# Patient Record
Sex: FEMALE | Race: WHITE | Employment: OTHER | URBAN - METROPOLITAN AREA
[De-identification: names, ages, dates, MRNs, and addresses within clinical notes are randomized per-mention and may not be internally consistent; named-entity substitution may affect disease eponyms.]

---

## 2019-10-01 ENCOUNTER — OFFICE VISIT (OUTPATIENT)
Dept: ORTHOPEDIC SURGERY | Facility: CLINIC | Age: 84
End: 2019-10-01

## 2019-10-01 VITALS
SYSTOLIC BLOOD PRESSURE: 125 MMHG | OXYGEN SATURATION: 97 % | HEIGHT: 60 IN | HEART RATE: 61 BPM | WEIGHT: 120 LBS | BODY MASS INDEX: 23.56 KG/M2 | DIASTOLIC BLOOD PRESSURE: 47 MMHG | RESPIRATION RATE: 16 BRPM | TEMPERATURE: 98.3 F

## 2019-10-01 DIAGNOSIS — M75.101 ROTATOR CUFF SYNDROME, RIGHT: Primary | ICD-10-CM

## 2019-10-01 RX ORDER — ALPRAZOLAM 0.25 MG/1
TABLET ORAL
COMMUNITY

## 2019-10-01 RX ORDER — TRIAMCINOLONE ACETONIDE 40 MG/ML
40 INJECTION, SUSPENSION INTRA-ARTICULAR; INTRAMUSCULAR ONCE
Qty: 1 VIAL | Refills: 0
Start: 2019-10-01 | End: 2019-10-01

## 2019-10-01 RX ORDER — ALBUTEROL SULFATE 90 UG/1
AEROSOL, METERED RESPIRATORY (INHALATION) AS NEEDED
COMMUNITY

## 2019-10-01 NOTE — PROGRESS NOTES
Verbal order given by Dr. Jayda Murillo to draw up 1cc of Kenalog and 3 cc of Xylocaine  Injection given in RIGHT shoulder

## 2019-10-01 NOTE — PROGRESS NOTES
Patient: Poonam Vasquez                MRN: [id number]       SSN: [id number]  YOB: 1930        AGE: 80 y.o. SEX: female  Body mass index is 23.44 kg/m². PCP: No primary care provider on file. 10/01/19    Chief Complaint: Right shoulder pain    HISTORY OF PRESENT ILLNESS:  Elihu Closs is a very pleasant, 80year-old female who comes in the office today with right shoulder pain. She has had right shoulder pain off and on for some time, but it got worse recently when her  who has some dementia fell directly into her arm. Since that time, she has been noticing difficulty with lifting and doing activities at home, as well as with overhead activities. She has pain at night. She has not had any specific physical therapy or injections for her shoulder. She has had x-rays. She denies any specific numbness or tingling. Past Medical History:   Diagnosis Date    Arthritis     bursitis l shoulder    Diverticular disease     Gall stones     GERD (gastroesophageal reflux disease)     Heart murmur     Hemorrhoids     Hypertension     Osteopenia     Osteoporosis     Other ill-defined conditions(799.89)     mur mur       History reviewed. No pertinent family history. Current Outpatient Medications   Medication Sig Dispense Refill    albuterol (PROVENTIL HFA, VENTOLIN HFA, PROAIR HFA) 90 mcg/actuation inhaler Take  by inhalation as needed for Wheezing.  ALPRAZolam (XANAX) 0.25 mg tablet Take  by mouth.  triamcinolone acetonide (KENALOG-40) 40 mg/mL injection 1 mL by Intra artICUlar route once for 1 dose. 1 Vial 0    metoprolol (LOPRESSOR) 25 mg tablet Take 25 mg by mouth two (2) times a day.  losartan (COZAAR) 100 mg tablet Take 50 mg by mouth daily.  simvastatin (ZOCOR) 20 mg tablet Take 20 mg by mouth nightly.  Cholecalciferol, Vitamin D3, (VITAMIN D3) 1,000 unit cap Take 1,000 mg by mouth.         aspirin 81 mg tablet Take 325 mg by mouth daily.  multivitamin (ONE A DAY) tablet Take 1 Tab by mouth daily.  omeprazole (PRILOSEC) 20 mg capsule Take 20 mg by mouth daily.  raloxifene (EVISTA) 60 mg tablet Take 60 mg by mouth daily.  amLODIPine (NORVASC) 5 mg tablet Take 5 mg by mouth daily.            Allergies   Allergen Reactions    Amoxicillin Hives    Feldene [Piroxicam] Other (comments)     Excess urination    Fosamax [Alendronate] Nausea and Vomiting    Naproxen Other (comments)     dizzy    Vicodin [Hydrocodone-Acetaminophen] Hives    Sulfa (Sulfonamide Antibiotics) Unknown (comments)    Tiazac [Diltiazem Hcl] Unknown (comments)       Past Surgical History:   Procedure Laterality Date    ABDOMEN SURGERY PROC UNLISTED      gall bladder    HX GI      colonoscopy    HX GYN      btl, D & C    HX HEENT      T & A       Social History     Socioeconomic History    Marital status:      Spouse name: Not on file    Number of children: Not on file    Years of education: Not on file    Highest education level: Not on file   Occupational History    Not on file   Social Needs    Financial resource strain: Not on file    Food insecurity:     Worry: Not on file     Inability: Not on file    Transportation needs:     Medical: Not on file     Non-medical: Not on file   Tobacco Use    Smoking status: Former Smoker    Smokeless tobacco: Never Used   Substance and Sexual Activity    Alcohol use: No    Drug use: No    Sexual activity: Not on file   Lifestyle    Physical activity:     Days per week: Not on file     Minutes per session: Not on file    Stress: Not on file   Relationships    Social connections:     Talks on phone: Not on file     Gets together: Not on file     Attends Shinto service: Not on file     Active member of club or organization: Not on file     Attends meetings of clubs or organizations: Not on file     Relationship status: Not on file    Intimate partner violence:     Fear of current or ex partner: Not on file     Emotionally abused: Not on file     Physically abused: Not on file     Forced sexual activity: Not on file   Other Topics Concern    Not on file   Social History Narrative    Not on file       REVIEW OF SYSTEMS:      CON: negative for recent weight loss/gain, fever, or chills  EYE: negative for double or blurry vision  ENT: negative for hoarseness  RS:   negative for cough, URI, SOB  CV:  negative for chest pain, palpitations  GI:    negative for blood in stool, nausea/vomiting  :  negative for blood in urine  MS: As per HPI  Other systems reviewed and noted below. PHYSICAL EXAMINATION:  Visit Vitals  /47   Pulse 61   Temp 98.3 °F (36.8 °C) (Oral)   Resp 16   Ht 5' (1.524 m)   Wt 120 lb (54.4 kg)   SpO2 97%   BMI 23.44 kg/m²     Body mass index is 23.44 kg/m². GENERAL: Alert and oriented x3, in no acute distress, well-developed, well-nourished. HEENT: Normocephalic, atraumatic. RESP: Non labored breathing with equal chest rise on inspiration. CV: Well perfused extremities. No cyanosis or clubbing noted. ABDOMEN: Soft, non-tender, non-distended. PHYSICAL EXAM:  Physical exam of the right shoulder with full range of motion. Pain and weakness with supraspinatus and infraspinatus testing. She does have some mild pain, but no weakness with belly press testing. She is nontender over the [facility] joint. No pain with impingement. She is neurovascularly intact distally. IMAGING:  X-rays of the right shoulder were taken in the office today. These show some sclerosis of the greater tuberosity, but no significant bony abnormalities otherwise. ASSESSMENT AND PLAN:   Homero Fink is an 80year-old female with likely a right rotator cuff tear. We had a lengthy discussion today regarding treatment options. I would hold off on ordering an MRI. I would like for her to try some home exercises along with a cortisone shot in the office today to see how she responds.   I will see her back as needed.         [facility] - [facility]  OFFICE PROCEDURE PROGRESS NOTE        Chart reviewed for the following:   Florian Essex, MD, have reviewed the History, Physical and updated the Allergic reactions for [address] performed immediately prior to start of procedure:   Florian Essex, MD, have performed the following reviews on Sepideh Mallard prior to the start of the procedure:            * Patient was identified by name and date of birth   * Agreement on procedure being performed was verified  * Risks and Benefits explained to the patient  * Procedure site verified and marked as necessary  * Patient was positioned for comfort  * Consent was signed and verified    Time: 1400      Date of procedure: 10/1/2019    Procedure performed by:  Julia Brush MD    Provider assisted by: Skyla Vera LPN    Patient assisted by: self    How tolerated by patient: tolerated the procedure well with no complications    Post Procedural Pain Scale: 0 - No Hurt    Comments: none                  Electronically signed by: Julia Brush MD

## 2019-12-24 ENCOUNTER — OFFICE VISIT (OUTPATIENT)
Dept: ORTHOPEDIC SURGERY | Facility: CLINIC | Age: 84
End: 2019-12-24

## 2019-12-24 VITALS
SYSTOLIC BLOOD PRESSURE: 157 MMHG | DIASTOLIC BLOOD PRESSURE: 60 MMHG | TEMPERATURE: 97.2 F | OXYGEN SATURATION: 92 % | HEIGHT: 59 IN | HEART RATE: 65 BPM | WEIGHT: 117 LBS | RESPIRATION RATE: 16 BRPM | BODY MASS INDEX: 23.59 KG/M2

## 2019-12-24 DIAGNOSIS — M75.101 ROTATOR CUFF SYNDROME, RIGHT: Primary | ICD-10-CM

## 2019-12-24 DIAGNOSIS — M19.011 ARTHRITIS OF RIGHT ACROMIOCLAVICULAR JOINT: ICD-10-CM

## 2019-12-24 RX ORDER — TRIAMCINOLONE ACETONIDE 40 MG/ML
40 INJECTION, SUSPENSION INTRA-ARTICULAR; INTRAMUSCULAR ONCE
Qty: 1 ML | Refills: 0
Start: 2019-12-24 | End: 2019-12-24

## 2019-12-24 RX ORDER — SERTRALINE HYDROCHLORIDE 100 MG/1
TABLET, FILM COATED ORAL DAILY
COMMUNITY

## 2019-12-24 NOTE — PROGRESS NOTES
Patient: Autumn Torres                MRN: [id number]       SSN: [id number]  YOB: 1930        AGE: 80 y.o. SEX: female  Body mass index is 23.46 kg/m². PCP: No primary care provider on file. 12/24/19    Chief Complaint: Right shoulder follow up    HISTORY OF PRESENT ILLNESS:  Zach Clemente returns to the office today for her right shoulder. She has continued to have some right shoulder pain, especially when reaching up and reaching across her body. She takes care of her  at home, which is also putting a lot of stress on her shoulder. She has had injections in the past, which she said helped for about a month and a half. Past Medical History:   Diagnosis Date    Arthritis     bursitis l shoulder    Diverticular disease     Gall stones     GERD (gastroesophageal reflux disease)     Heart murmur     Hemorrhoids     Hypertension     Osteopenia     Osteoporosis     Other ill-defined conditions(799.89)     mur mur       History reviewed. No pertinent family history. Current Outpatient Medications   Medication Sig Dispense Refill    sertraline (ZOLOFT) 100 mg tablet Take  by mouth daily.  triamcinolone acetonide (KENALOG) 40 mg/mL injection 1 mL by Intra artICUlar route once for 1 dose. 1 mL 0    triamcinolone acetonide (KENALOG) 40 mg/mL injection 1 mL by Intra artICUlar route once for 1 dose. 1 mL 0    albuterol (PROVENTIL HFA, VENTOLIN HFA, PROAIR HFA) 90 mcg/actuation inhaler Take  by inhalation as needed for Wheezing.  ALPRAZolam (XANAX) 0.25 mg tablet Take  by mouth.  omeprazole (PRILOSEC) 20 mg capsule Take 20 mg by mouth daily.  metoprolol (LOPRESSOR) 25 mg tablet Take 25 mg by mouth two (2) times a day.  raloxifene (EVISTA) 60 mg tablet Take 60 mg by mouth daily.  amLODIPine (NORVASC) 5 mg tablet Take 5 mg by mouth daily.  losartan (COZAAR) 100 mg tablet Take 50 mg by mouth daily.       simvastatin (ZOCOR) 20 mg tablet Take 20 mg by mouth nightly.  Cholecalciferol, Vitamin D3, (VITAMIN D3) 1,000 unit cap Take 1,000 mg by mouth.  aspirin 81 mg tablet Take 325 mg by mouth daily.  multivitamin (ONE A DAY) tablet Take 1 Tab by mouth daily.            Allergies   Allergen Reactions    Amoxicillin Hives    Feldene [Piroxicam] Other (comments)     Excess urination    Fosamax [Alendronate] Nausea and Vomiting    Naproxen Other (comments)     dizzy    Vicodin [Hydrocodone-Acetaminophen] Hives    Sulfa (Sulfonamide Antibiotics) Unknown (comments)    Tiazac [Diltiazem Hcl] Unknown (comments)       Past Surgical History:   Procedure Laterality Date    ABDOMEN SURGERY PROC UNLISTED      gall bladder    HX GI      colonoscopy    HX GYN      btl, D & C    HX HEENT      T & A       Social History     Socioeconomic History    Marital status:      Spouse name: Not on file    Number of children: Not on file    Years of education: Not on file    Highest education level: Not on file   Occupational History    Not on file   Social Needs    Financial resource strain: Not on file    Food insecurity:     Worry: Not on file     Inability: Not on file    Transportation needs:     Medical: Not on file     Non-medical: Not on file   Tobacco Use    Smoking status: Former Smoker    Smokeless tobacco: Never Used   Substance and Sexual Activity    Alcohol use: No    Drug use: No    Sexual activity: Not on file   Lifestyle    Physical activity:     Days per week: Not on file     Minutes per session: Not on file    Stress: Not on file   Relationships    Social connections:     Talks on phone: Not on file     Gets together: Not on file     Attends [employer] service: Not on file     Active member of club or organization: Not on file     Attends meetings of clubs or organizations: Not on file     Relationship status: Not on file    Intimate partner violence:     Fear of current or ex partner: Not on file Emotionally abused: Not on file     Physically abused: Not on file     Forced sexual activity: Not on file   Other Topics Concern    Not on file   Social History Narrative    Not on file       REVIEW OF SYSTEMS:      No changes from previous review of systems unless noted. PHYSICAL EXAMINATION:  Visit Vitals  /60 (BP 1 Location: Left arm, BP Patient Position: Sitting)   Pulse 65   Temp 97.2 °F (36.2 °C) (Oral)   Resp 16   Ht 4' 11.21\" (1.504 m)   Wt 117 lb (53.1 kg)   SpO2 92%   BMI 23.46 kg/m²     Body mass index is 23.46 kg/m². GENERAL: Alert and oriented x3, in no acute distress. HEENT: Normocephalic, atraumatic. RESP: Non labored breathing. SKIN: No rashes or lesions noted. PHYSICAL EXAM:  Physical exam of the right shoulder with full range of motion, but pain with cross body adduction, as well as forward flexion. She is tender to palpation over the [facility] joint. She also has some mild tenderness to palpation over the bicipital groove. She has pain with resisted supraspinatus and infraspinatus rotator cuff testing. She is neurovascularly intact distally. ASSESSMENT AND PLAN:   Lc Maguire continues to have right shoulder pain, likely related to some mild AC arthritis, as well as rotator cuff pathology. At this point, she is not a good candidate for surgery and I would not recommend that. I did talk with her about physical therapy, but with her 's situation, she really does not have time for that. She would like to try another cortisone shot, which I gave her in the [facility] joint, as well as the subacromial space. We will see how she responds. Follow up as needed.        [facility] - [facility]  OFFICE PROCEDURE PROGRESS NOTE        Chart reviewed for the following:   Rolan Mao MD, have reviewed the History, Physical and updated the Allergic reactions for [employer] performed immediately prior to start of procedure:   Barbie Lehman Rosa Elena Winter MD, have performed the following reviews on Gail Sexton prior to the start of the procedure:            * Patient was identified by name and date of birth   * Agreement on procedure being performed was verified  * Risks and Benefits explained to the patient  * Procedure site verified and marked as necessary  * Patient was positioned for comfort  * Consent was signed and verified    Time: 1130      Date of procedure: 12/24/2019    Procedure performed by:  Angie Montana MD    Provider assisted by: Argenis Davis LPN    Patient assisted by: self    How tolerated by patient: tolerated the procedure well with no complications    Post Procedural Pain Scale: 0 - No Hurt    Comments: none                  Electronically signed by: Angie Montana MD

## 2019-12-24 NOTE — PROGRESS NOTES
1. Have you been to the ER, urgent care clinic since your last visit? Hospitalized since your last visit? NO    2. Have you seen or consulted any other health care providers outside of the [address] [place] [address] since your last visit? Include any pap smears or colon screening.    NO

## 2020-02-11 ENCOUNTER — OFFICE VISIT (OUTPATIENT)
Dept: ORTHOPEDIC SURGERY | Facility: CLINIC | Age: 85
End: 2020-02-11

## 2020-02-11 VITALS
DIASTOLIC BLOOD PRESSURE: 55 MMHG | WEIGHT: 115 LBS | OXYGEN SATURATION: 95 % | TEMPERATURE: 97.6 F | HEART RATE: 60 BPM | HEIGHT: 59 IN | BODY MASS INDEX: 23.18 KG/M2 | SYSTOLIC BLOOD PRESSURE: 140 MMHG | RESPIRATION RATE: 18 BRPM

## 2020-02-11 DIAGNOSIS — M75.101 ROTATOR CUFF SYNDROME, RIGHT: Primary | ICD-10-CM

## 2020-02-11 DIAGNOSIS — M19.011 ARTHRITIS OF RIGHT ACROMIOCLAVICULAR JOINT: ICD-10-CM

## 2020-02-11 RX ORDER — TRIAMCINOLONE ACETONIDE 40 MG/ML
40 INJECTION, SUSPENSION INTRA-ARTICULAR; INTRAMUSCULAR ONCE
Qty: 1 VIAL | Refills: 0
Start: 2020-02-11 | End: 2020-02-11

## 2020-02-11 NOTE — PROGRESS NOTES
Patient: Helga Christian                MRN: [id number]       SSN: [id number]  YOB: 1930        AGE: 80 y.o. SEX: female  Body mass index is 23.23 kg/m². PCP: No primary care provider on file. 02/11/20    Chief Complaint: Right shoulder pain    HISTORY OF PRESENT ILLNESS:  Umesh Hanson returns today for her right shoulder. She reports pain with movement. At rest, she has very little pain. It is mostly over the [facility] joint and deep in the shoulder. She has been dealing with it recently. Unfortunately, her  recently passed away. She has been dealing with that since I last saw her. Past Medical History:   Diagnosis Date    Arthritis     bursitis l shoulder    Diverticular disease     Gall stones     GERD (gastroesophageal reflux disease)     Heart murmur     Hemorrhoids     Hypertension     Osteopenia     Osteoporosis     Other ill-defined conditions(599.13)     mur mur       History reviewed. No pertinent family history. Current Outpatient Medications   Medication Sig Dispense Refill    triamcinolone acetonide (KENALOG-40) 40 mg/mL injection 1 mL by Intra artICUlar route once for 1 dose. 1 Vial 0    sertraline (ZOLOFT) 100 mg tablet Take  by mouth daily.  albuterol (PROVENTIL HFA, VENTOLIN HFA, PROAIR HFA) 90 mcg/actuation inhaler Take  by inhalation as needed for Wheezing.  ALPRAZolam (XANAX) 0.25 mg tablet Take  by mouth.  metoprolol (LOPRESSOR) 25 mg tablet Take 25 mg by mouth two (2) times a day.  losartan (COZAAR) 100 mg tablet Take 50 mg by mouth daily.  simvastatin (ZOCOR) 20 mg tablet Take 20 mg by mouth nightly.  Cholecalciferol, Vitamin D3, (VITAMIN D3) 1,000 unit cap Take 1,000 mg by mouth.  aspirin 81 mg tablet Take 325 mg by mouth daily.  multivitamin (ONE A DAY) tablet Take 1 Tab by mouth daily.  omeprazole (PRILOSEC) 20 mg capsule Take 20 mg by mouth daily.         raloxifene (EVISTA) 60 mg tablet Take 60 mg by mouth daily.  amLODIPine (NORVASC) 5 mg tablet Take 5 mg by mouth daily.            Allergies   Allergen Reactions    Amoxicillin Hives    Feldene [Piroxicam] Other (comments)     Excess urination    Fosamax [Alendronate] Nausea and Vomiting    Naproxen Other (comments)     dizzy    Vicodin [Hydrocodone-Acetaminophen] Hives    Sulfa (Sulfonamide Antibiotics) Unknown (comments)    Tiazac [Diltiazem Hcl] Unknown (comments)       Past Surgical History:   Procedure Laterality Date    ABDOMEN SURGERY PROC UNLISTED      gall bladder    HX GI      colonoscopy    HX GYN      btl, D & C    HX HEENT      T & A       Social History     Socioeconomic History    Marital status:      Spouse name: Not on file    Number of children: Not on file    Years of education: Not on file    Highest education level: Not on file   Occupational History    Not on file   Social Needs    Financial resource strain: Not on file    Food insecurity:     Worry: Not on file     Inability: Not on file    Transportation needs:     Medical: Not on file     Non-medical: Not on file   Tobacco Use    Smoking status: Former Smoker    Smokeless tobacco: Never Used   Substance and Sexual Activity    Alcohol use: No    Drug use: No    Sexual activity: Not on file   Lifestyle    Physical activity:     Days per week: Not on file     Minutes per session: Not on file    Stress: Not on file   Relationships    Social connections:     Talks on phone: Not on file     Gets together: Not on file     Attends Sabianist service: Not on file     Active member of club or organization: Not on file     Attends meetings of clubs or organizations: Not on file     Relationship status: Not on file    Intimate partner violence:     Fear of current or ex partner: Not on file     Emotionally abused: Not on file     Physically abused: Not on file     Forced sexual activity: Not on file   Other Topics Concern    Not on file   Social History Narrative    Not on file       REVIEW OF SYSTEMS:      No changes from previous review of systems unless noted. PHYSICAL EXAMINATION:  Visit Vitals  /55   Pulse 60   Temp 97.6 °F (36.4 °C) (Oral)   Resp 18   Ht 4' 11\" (1.499 m)   Wt 115 lb (52.2 kg)   SpO2 95%   BMI 23.23 kg/m²     Body mass index is 23.23 kg/m². GENERAL: Alert and oriented x3, in no acute distress. HEENT: Normocephalic, atraumatic. RESP: Non labored breathing. SKIN: No rashes or lesions noted. PHYSICAL EXAM:  Physical exam of the right shoulder with full active and passive range of motion. She has pain over the [facility] joint and pain with cross body adduction. She is tender over the [facility] joint. She also has some mild tenderness over the bicipital groove and rotator cuff. She has pain with rotator cuff strength testing. ASSESSMENT AND PLAN:   Jose Alberto Vanegas continues to have right shoulder pain. We discussed several treatment options today. [facility] injection and subacromial injection. She tolerated this well. I will see her back as needed.         [facility] - [facility]  OFFICE PROCEDURE PROGRESS NOTE        Chart reviewed for the following:   Leia Carpenter MD, have reviewed the History, Physical and updated the Allergic reactions for [address] performed immediately prior to start of procedure:   Leia Carpenter MD, have performed the following reviews on [facility] prior to the start of the procedure:            * Patient was identified by name and date of birth   * Agreement on procedure being performed was verified  * Risks and Benefits explained to the patient  * Procedure site verified and marked as necessary  * Patient was positioned for comfort  * Consent was signed and verified    Time: 1620      Date of procedure: 2/11/2020    Procedure performed by:  Shannen Cook MD    Provider assisted by: Susan Orta LPN    Patient assisted by: self    How tolerated by patient: tolerated the procedure well with no complications    Post Procedural Pain Scale: 0 - No Hurt    Comments: none                  Electronically signed by: Beryle Primmer, MD

## 2020-07-28 ENCOUNTER — OFFICE VISIT (OUTPATIENT)
Dept: ORTHOPEDIC SURGERY | Facility: CLINIC | Age: 85
End: 2020-07-28

## 2020-07-28 VITALS
TEMPERATURE: 98.3 F | WEIGHT: 115 LBS | BODY MASS INDEX: 23.18 KG/M2 | RESPIRATION RATE: 16 BRPM | DIASTOLIC BLOOD PRESSURE: 59 MMHG | SYSTOLIC BLOOD PRESSURE: 146 MMHG | OXYGEN SATURATION: 94 % | HEIGHT: 59 IN | HEART RATE: 64 BPM

## 2020-07-28 DIAGNOSIS — M54.32 SCIATICA, LEFT SIDE: Primary | ICD-10-CM

## 2020-07-28 RX ORDER — METHYLPREDNISOLONE 4 MG/1
TABLET ORAL
Qty: 1 DOSE PACK | Refills: 0 | Status: SHIPPED | OUTPATIENT
Start: 2020-07-28

## 2020-07-28 NOTE — PROGRESS NOTES
Patient: Sepideh Contreras                MRN: [id number]       SSN: [id number]  YOB: 1930        AGE: 80 y.o. SEX: female  Body mass index is 23.23 kg/m². PCP: No primary care provider on file.  07/28/20    Chief Complaint: Left leg pain    HPI: Sepideh Contreras is a 80 y.o. female patient who returns to the office with a new complaint. She has pain radiating from her buttock down the posterior aspect of her left leg. She also notices numbness and tingling. She says is worse in the morning when she takes a few steps. She is not taking any medication for this. She has not been seen by spine. Past Medical History:   Diagnosis Date    Arthritis     bursitis l shoulder    Diverticular disease     Gall stones     GERD (gastroesophageal reflux disease)     Heart murmur     Hemorrhoids     Hypertension     Osteopenia     Osteoporosis     Other ill-defined conditions(379.33)     mur mur       History reviewed. No pertinent family history. Current Outpatient Medications   Medication Sig Dispense Refill    methylPREDNISolone (MEDROL DOSEPACK) 4 mg tablet Per dose pack instructions 1 Dose Pack 0    sertraline (ZOLOFT) 100 mg tablet Take  by mouth daily.  albuterol (PROVENTIL HFA, VENTOLIN HFA, PROAIR HFA) 90 mcg/actuation inhaler Take  by inhalation as needed for Wheezing.  ALPRAZolam (XANAX) 0.25 mg tablet Take  by mouth.  metoprolol (LOPRESSOR) 25 mg tablet Take 25 mg by mouth two (2) times a day.  raloxifene (EVISTA) 60 mg tablet Take 60 mg by mouth daily.  amLODIPine (NORVASC) 5 mg tablet Take 5 mg by mouth daily.  losartan (COZAAR) 100 mg tablet Take 50 mg by mouth daily.  Cholecalciferol, Vitamin D3, (VITAMIN D3) 1,000 unit cap Take 1,000 mg by mouth.  aspirin 81 mg tablet Take 325 mg by mouth daily.  multivitamin (ONE A DAY) tablet Take 1 Tab by mouth daily.         omeprazole (PRILOSEC) 20 mg capsule Take 20 mg by mouth daily.  simvastatin (ZOCOR) 20 mg tablet Take 20 mg by mouth nightly.            Allergies   Allergen Reactions    Amoxicillin Hives    Feldene [Piroxicam] Other (comments)     Excess urination    Fosamax [Alendronate] Nausea and Vomiting    Naproxen Other (comments)     dizzy    Vicodin [Hydrocodone-Acetaminophen] Hives    Sulfa (Sulfonamide Antibiotics) Unknown (comments)    Tiazac [Diltiazem Hcl] Unknown (comments)       Past Surgical History:   Procedure Laterality Date    ABDOMEN SURGERY PROC UNLISTED      gall bladder    HX GI      colonoscopy    HX GYN      btl, D & C    HX HEENT      T & A       Social History     Socioeconomic History    Marital status:      Spouse name: Not on file    Number of children: Not on file    Years of education: Not on file    Highest education level: Not on file   Occupational History    Not on file   Social Needs    Financial resource strain: Not on file    Food insecurity     Worry: Not on file     Inability: Not on file    Transportation needs     Medical: Not on file     Non-medical: Not on file   Tobacco Use    Smoking status: Former Smoker    Smokeless tobacco: Never Used   Substance and Sexual Activity    Alcohol use: No    Drug use: No    Sexual activity: Not on file   Lifestyle    Physical activity     Days per week: Not on file     Minutes per session: Not on file    Stress: Not on file   Relationships    Social connections     Talks on phone: Not on file     Gets together: Not on file     Attends [employer] service: Not on file     Active member of club or organization: Not on file     Attends meetings of clubs or organizations: Not on file     Relationship status: Not on file    Intimate partner violence     Fear of current or ex partner: Not on file     Emotionally abused: Not on file     Physically abused: Not on file     Forced sexual activity: Not on file   Other Topics Concern    Not on file   Social History Narrative    Not on file       REVIEW OF SYSTEMS:      No changes from previous review of systems unless noted. PHYSICAL EXAMINATION:  Visit Vitals  /59 (BP 1 Location: Left arm, BP Patient Position: Sitting)   Pulse 64   Temp 98.3 °F (36.8 °C)   Resp 16   Ht 4' 11\" (1.499 m)   Wt 115 lb (52.2 kg)   SpO2 94%   BMI 23.23 kg/m²     Body mass index is 23.23 kg/m². GENERAL: Alert and oriented x3, in no acute distress. HEENT: Normocephalic, atraumatic. RESP: Non labored breathing. SKIN: No rashes or lesions noted. Lumbar Spine Examination  ROM   Flexion    Full   Extension   Full   Lateral Rotation  Full  Tenderness L Spine   None  Stepoff L Spine   None  Sensation L2-S1   Intact  Motor L2-S1    5/5  Focal Neuro Deficits   None  Straight Leg Raise Test   Right Negative  Left Positive  Other Findings:   None      IMAGING:  None    ASSESSMENT & PLAN  Diagnosis: Left sciatic pain    I discussed several treatment options with her and recommended a medrol dose pack and a referral to spine. She will try the dose pack and if her symptoms persist she will make an appointment at the spine center.       Electronically signed by: Pita Newberry MD

## 2021-01-15 ENCOUNTER — IMPORTED ENCOUNTER (OUTPATIENT)
Dept: URBAN - METROPOLITAN AREA CLINIC 1 | Facility: CLINIC | Age: 86
End: 2021-01-15

## 2021-01-15 PROBLEM — Z96.1: Noted: 2021-01-15

## 2021-01-15 PROBLEM — H26.492: Noted: 2021-01-15

## 2021-01-15 PROBLEM — H04.123: Noted: 2021-01-15

## 2021-01-15 PROBLEM — H35.3131: Noted: 2021-01-15

## 2021-01-15 PROCEDURE — 92015 DETERMINE REFRACTIVE STATE: CPT

## 2021-01-15 PROCEDURE — 92004 COMPRE OPH EXAM NEW PT 1/>: CPT

## 2021-01-15 NOTE — PATIENT DISCUSSION
1.  Dry Eyes OU - Recommend ATs BID-TID OU routinely 2. ARMD OU Early/dry/stable. Importance of daily AREDS II study multivitamin and Amsler Grid checks discussed with patient. Patient to follow-up immediately with any new onset of decreased vision and/or metamorphopsia. 3. PCO  OS: (Posterior Capsule Opacification)   Observe and consider yag cap when pt feels pco visually significant and visual acuity decreases to appropriate level. 4. Pseudophakia OU - (Mitrev) 5. 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 Return for an appointment in 1 year 27 with Dr. Marie Shirley.

## 2021-07-13 ENCOUNTER — IMPORTED ENCOUNTER (OUTPATIENT)
Dept: URBAN - METROPOLITAN AREA CLINIC 1 | Facility: CLINIC | Age: 86
End: 2021-07-13

## 2021-07-13 PROBLEM — H04.123: Noted: 2021-07-13

## 2021-07-13 PROBLEM — H18.423: Noted: 2021-07-13

## 2021-07-13 PROBLEM — H01.001: Noted: 2021-07-13

## 2021-07-13 PROBLEM — Z96.1: Noted: 2021-07-13

## 2021-07-13 PROBLEM — H26.492: Noted: 2021-07-13

## 2021-07-13 PROBLEM — H01.004: Noted: 2021-07-13

## 2021-07-13 PROCEDURE — 99213 OFFICE O/P EST LOW 20 MIN: CPT

## 2021-07-13 NOTE — PATIENT DISCUSSION
1.  Anterior Blepharitis OU - Daily Hot compresses and lid scrubs were recommended. 2. Dry Eyes OU - Recommend ATs TID-QID OU routinely  3. Band Keratopathy OU - Observe 4. OHTN OD - IOP elevated today observe off gtts and will recheck at next visit. Consider aqueous suppressants if IOP remains elevated. 5.  PCO  OS: (Posterior Capsule Opacification)   Observe and consider yag cap when pt feels pco visually significant and visual acuity decreases to appropriate level. 5. Pseudophakia OU - (Mitrev) 6. H/o ARMD OU 7. H/o NAION OS - h/o horizontal scotoma VF defectReturn for an appointment in 1-2 months 10/OCT with Dr. Angelita Conner.

## 2021-09-22 ENCOUNTER — IMPORTED ENCOUNTER (OUTPATIENT)
Dept: URBAN - METROPOLITAN AREA CLINIC 1 | Facility: CLINIC | Age: 86
End: 2021-09-22

## 2021-09-22 ENCOUNTER — PREPPED CHART (OUTPATIENT)
Dept: URBAN - METROPOLITAN AREA CLINIC 2 | Facility: CLINIC | Age: 86
End: 2021-09-22

## 2021-09-22 PROBLEM — H40.051: Noted: 2021-09-22

## 2021-09-22 PROCEDURE — 92133 CPTRZD OPH DX IMG PST SGM ON: CPT

## 2021-09-22 PROCEDURE — 99213 OFFICE O/P EST LOW 20 MIN: CPT

## 2021-09-22 NOTE — PATIENT DISCUSSION
(CD:0.10) - OCT WNL. IOP stable. Consider aqueous suppressants with future progression/elevated IOP. Observe for changes/progression.

## 2021-09-22 NOTE — PATIENT DISCUSSION
1.  OHTN OD (CD 0.10) - OCT WNL. IOP stable. Consider aqueous suppressants with future progression/elevated IOP. Observe for changes/progression. 2.  Anterior Blepharitis OU - Daily Hot compresses and lid scrubs were recommended. 3. Dry Eyes OU - Recommend ATs TID-QID OU routinely  4. Band Keratopathy OU - Observe  5. PCO  OS: (Posterior Capsule Opacification)   Observe and consider yag cap when pt feels pco visually significant and visual acuity decreases to appropriate level. 5. Pseudophakia OU - (Mitrev) 6. H/o ARMD OU 7. H/o NAION OS - h/o horizontal scotoma VF defectReturn for an appointment for Return as scheduled with Dr. Carmen Alvarez.

## 2021-09-22 NOTE — PATIENT DISCUSSION
Observe and consider YAG cap when patient feels PCO visually significant and acuity decreases to appropriate level.

## 2021-09-22 NOTE — PATIENT DISCUSSION
Anterior Blepharitis OU - Daily Hot compresses and lid scrubs were recommended. 3. Dry Eyes OU - Recommend ATs TID-QID OU routinely  4. Band Keratopathy OU - Observe  5. PCO  OS: (Posterior Capsule Opacification)   Observe and consider yag cap when pt feels pco visually significant and visual acuity decreases to appropriate level. 5. Pseudophakia OU - (Mitrev) 6. H/o ARMD OU 7.   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

## 2022-02-17 ASSESSMENT — TONOMETRY
OS_IOP_MMHG: 15
OD_IOP_MMHG: 15

## 2022-02-17 ASSESSMENT — VISUAL ACUITY
OS_CC: 20/25
OD_CC: 20/30

## 2022-02-25 ENCOUNTER — COMPREHENSIVE EXAM (OUTPATIENT)
Dept: URBAN - METROPOLITAN AREA CLINIC 2 | Facility: CLINIC | Age: 87
End: 2022-02-25

## 2022-02-25 DIAGNOSIS — H40.051: ICD-10-CM

## 2022-02-25 DIAGNOSIS — Z96.1: ICD-10-CM

## 2022-02-25 DIAGNOSIS — H35.3131: ICD-10-CM

## 2022-02-25 DIAGNOSIS — H26.492: ICD-10-CM

## 2022-02-25 PROCEDURE — 99214 OFFICE O/P EST MOD 30 MIN: CPT

## 2022-02-25 ASSESSMENT — TONOMETRY
OD_IOP_MMHG: 19
OS_IOP_MMHG: 15

## 2022-02-25 ASSESSMENT — VISUAL ACUITY
OS_CC: 20/25
OD_CC: 20/25
OD_SC: 20/25

## 2022-02-25 NOTE — PATIENT DISCUSSION
(CD:0.10) - IOP stable. Consider aqueous suppressants with future progression/elevated IOP. Observe for changes/progression.

## 2022-04-03 ASSESSMENT — VISUAL ACUITY
OD_SC: 20/30
OS_SC: 20/30
OD_SC: 20/25
OD_SC: 20/20-2
OS_SC: 20/25+1
OS_SC: 20/25

## 2022-04-03 ASSESSMENT — TONOMETRY
OD_IOP_MMHG: 22
OS_IOP_MMHG: 11
OD_IOP_MMHG: 15
OD_IOP_MMHG: 11
OS_IOP_MMHG: 15
OS_IOP_MMHG: 14

## 2023-04-14 ENCOUNTER — POST-OP (OUTPATIENT)
Dept: URBAN - METROPOLITAN AREA CLINIC 2 | Facility: CLINIC | Age: 88
End: 2023-04-14

## 2023-04-14 DIAGNOSIS — Z96.1: ICD-10-CM

## 2023-04-14 PROCEDURE — 99024 POSTOP FOLLOW-UP VISIT: CPT

## 2023-04-14 ASSESSMENT — VISUAL ACUITY
OD_SC: 20/30-1
OS_CC: 20/20-1
OD_CC: 20/20+3
OS_SC: 20/60

## 2023-04-14 ASSESSMENT — TONOMETRY
OD_IOP_MMHG: 22
OS_IOP_MMHG: 18

## 2023-06-28 ENCOUNTER — EMERGENCY VISIT (OUTPATIENT)
Dept: URBAN - METROPOLITAN AREA CLINIC 2 | Facility: CLINIC | Age: 88
End: 2023-06-28

## 2023-06-28 DIAGNOSIS — R51.9: ICD-10-CM

## 2023-06-28 PROCEDURE — 92012 INTRM OPH EXAM EST PATIENT: CPT

## 2023-06-28 ASSESSMENT — VISUAL ACUITY
OS_CC: 20/20
OD_CC: 20/25

## 2023-06-28 ASSESSMENT — TONOMETRY
OD_IOP_MMHG: 18
OS_IOP_MMHG: 15

## 2024-08-28 ENCOUNTER — COMPREHENSIVE EXAM (OUTPATIENT)
Dept: URBAN - METROPOLITAN AREA CLINIC 2 | Facility: CLINIC | Age: 89
End: 2024-08-28

## 2024-08-28 DIAGNOSIS — Z96.1: ICD-10-CM

## 2024-08-28 DIAGNOSIS — H18.593: ICD-10-CM

## 2024-08-28 DIAGNOSIS — H35.3131: ICD-10-CM

## 2024-08-28 PROCEDURE — 92015 DETERMINE REFRACTIVE STATE: CPT

## 2024-08-28 PROCEDURE — 92014 COMPRE OPH EXAM EST PT 1/>: CPT

## 2024-08-28 PROCEDURE — 92134 CPTRZ OPH DX IMG PST SGM RTA: CPT

## 2024-08-28 ASSESSMENT — TONOMETRY
OD_IOP_MMHG: 18
OS_IOP_MMHG: 16

## 2024-08-28 ASSESSMENT — VISUAL ACUITY
OS_CC: 20/25
OD_CC: 20/40

## 2025-01-29 NOTE — PATIENT DISCUSSION
H/o horizontal scotoma VF defect. DISPLAY PLAN FREE TEXT DISPLAY PLAN FREE TEXT DISPLAY PLAN FREE TEXT DISPLAY PLAN FREE TEXT DISPLAY PLAN FREE TEXT DISPLAY PLAN FREE TEXT